# Patient Record
Sex: FEMALE | Race: WHITE | Employment: OTHER | URBAN - METROPOLITAN AREA
[De-identification: names, ages, dates, MRNs, and addresses within clinical notes are randomized per-mention and may not be internally consistent; named-entity substitution may affect disease eponyms.]

---

## 2017-01-05 DIAGNOSIS — R63.4 WEIGHT LOSS: ICD-10-CM

## 2017-01-05 RX ORDER — FENTANYL 25 UG/H
1 PATCH TRANSDERMAL
Qty: 10 PATCH | Refills: 0 | Status: SHIPPED | OUTPATIENT
Start: 2017-01-05 | End: 2017-02-06 | Stop reason: SDUPTHER

## 2017-01-05 RX ORDER — LOSARTAN POTASSIUM AND HYDROCHLOROTHIAZIDE 12.5; 5 MG/1; MG/1
1 TABLET ORAL DAILY
Qty: 90 TABLET | Refills: 3 | Status: SHIPPED | OUTPATIENT
Start: 2017-01-05 | End: 2018-01-15 | Stop reason: SDUPTHER

## 2017-01-09 RX ORDER — DILTIAZEM HYDROCHLORIDE 120 MG/1
CAPSULE, COATED, EXTENDED RELEASE ORAL
Qty: 30 CAPSULE | Refills: 3 | Status: SHIPPED | OUTPATIENT
Start: 2017-01-09 | End: 2017-04-24 | Stop reason: SDUPTHER

## 2017-01-24 RX ORDER — DILTIAZEM HYDROCHLORIDE 120 MG/1
CAPSULE, EXTENDED RELEASE ORAL
Qty: 30 CAPSULE | Refills: 3 | Status: SHIPPED | OUTPATIENT
Start: 2017-01-24 | End: 2018-03-30 | Stop reason: SDUPTHER

## 2017-02-06 DIAGNOSIS — R63.4 WEIGHT LOSS: ICD-10-CM

## 2017-02-06 RX ORDER — FENTANYL 25 UG/H
1 PATCH TRANSDERMAL
Qty: 10 PATCH | Refills: 0 | Status: SHIPPED | OUTPATIENT
Start: 2017-02-06 | End: 2017-03-09 | Stop reason: SDUPTHER

## 2017-03-09 DIAGNOSIS — R63.4 WEIGHT LOSS: ICD-10-CM

## 2017-03-10 RX ORDER — FENTANYL 25 UG/H
1 PATCH TRANSDERMAL
Qty: 10 PATCH | Refills: 0 | Status: SHIPPED | OUTPATIENT
Start: 2017-03-10 | End: 2017-04-07 | Stop reason: SDUPTHER

## 2017-04-07 DIAGNOSIS — R63.4 WEIGHT LOSS: ICD-10-CM

## 2017-04-07 RX ORDER — FENTANYL 25 UG/H
1 PATCH TRANSDERMAL
Qty: 10 PATCH | Refills: 0 | Status: SHIPPED | OUTPATIENT
Start: 2017-04-07 | End: 2017-05-09 | Stop reason: SDUPTHER

## 2017-04-07 RX ORDER — ALENDRONATE SODIUM 70 MG/1
70 TABLET ORAL
Qty: 4 TABLET | Refills: 0 | Status: SHIPPED | OUTPATIENT
Start: 2017-04-07 | End: 2017-05-08 | Stop reason: SDUPTHER

## 2017-04-21 ENCOUNTER — OFFICE VISIT (OUTPATIENT)
Dept: FAMILY MEDICINE CLINIC | Age: 82
End: 2017-04-21
Payer: MEDICARE

## 2017-04-21 VITALS — BODY MASS INDEX: 21.95 KG/M2 | WEIGHT: 120 LBS | DIASTOLIC BLOOD PRESSURE: 62 MMHG | SYSTOLIC BLOOD PRESSURE: 118 MMHG

## 2017-04-21 DIAGNOSIS — Z23 IMMUNIZATION DUE: ICD-10-CM

## 2017-04-21 DIAGNOSIS — E78.00 HIGH CHOLESTEROL: ICD-10-CM

## 2017-04-21 DIAGNOSIS — I10 ESSENTIAL HYPERTENSION: Primary | ICD-10-CM

## 2017-04-21 DIAGNOSIS — Z00.00 ROUTINE HEALTH MAINTENANCE: ICD-10-CM

## 2017-04-21 PROCEDURE — G8510 SCR DEP NEG, NO PLAN REQD: HCPCS | Performed by: FAMILY MEDICINE

## 2017-04-21 PROCEDURE — 99214 OFFICE O/P EST MOD 30 MIN: CPT | Performed by: FAMILY MEDICINE

## 2017-04-21 PROCEDURE — 90732 PPSV23 VACC 2 YRS+ SUBQ/IM: CPT | Performed by: FAMILY MEDICINE

## 2017-04-21 PROCEDURE — 3288F FALL RISK ASSESSMENT DOCD: CPT | Performed by: FAMILY MEDICINE

## 2017-04-21 PROCEDURE — G0009 ADMIN PNEUMOCOCCAL VACCINE: HCPCS | Performed by: FAMILY MEDICINE

## 2017-04-21 ASSESSMENT — PATIENT HEALTH QUESTIONNAIRE - PHQ9
SUM OF ALL RESPONSES TO PHQ9 QUESTIONS 1 & 2: 0
1. LITTLE INTEREST OR PLEASURE IN DOING THINGS: 0
2. FEELING DOWN, DEPRESSED OR HOPELESS: 0
SUM OF ALL RESPONSES TO PHQ QUESTIONS 1-9: 0

## 2017-04-24 RX ORDER — DILTIAZEM HYDROCHLORIDE 120 MG/1
CAPSULE, COATED, EXTENDED RELEASE ORAL
Qty: 90 CAPSULE | Refills: 1 | Status: SHIPPED | OUTPATIENT
Start: 2017-04-24 | End: 2017-10-16 | Stop reason: SDUPTHER

## 2017-04-25 LAB
ALBUMIN SERPL-MCNC: 4.4 G/DL
ALP BLD-CCNC: 85 U/L
ALT SERPL-CCNC: 19 U/L
AST SERPL-CCNC: 31 U/L
BILIRUB SERPL-MCNC: 0.5 MG/DL (ref 0.1–1.4)
BUN BLDV-MCNC: 17 MG/DL
CALCIUM SERPL-MCNC: 10.7 MG/DL
CHLORIDE BLD-SCNC: 101 MMOL/L
CHOLESTEROL, TOTAL: 182 MG/DL
CHOLESTEROL/HDL RATIO: 2.3
CO2: 27 MMOL/L
CREAT SERPL-MCNC: 0.9 MG/DL
GFR CALCULATED: 60
GLUCOSE BLD-MCNC: 95 MG/DL
HDLC SERPL-MCNC: 79 MG/DL (ref 35–70)
LDL CHOLESTEROL CALCULATED: 91 MG/DL (ref 0–160)
POTASSIUM SERPL-SCNC: 3.9 MMOL/L
SODIUM BLD-SCNC: 140 MMOL/L
TOTAL PROTEIN: 7.2
TRIGL SERPL-MCNC: 62 MG/DL
VLDLC SERPL CALC-MCNC: 12 MG/DL

## 2017-04-27 DIAGNOSIS — E78.00 HIGH CHOLESTEROL: ICD-10-CM

## 2017-04-27 DIAGNOSIS — I10 ESSENTIAL HYPERTENSION: ICD-10-CM

## 2017-05-08 RX ORDER — ALENDRONATE SODIUM 70 MG/1
70 TABLET ORAL
Qty: 4 TABLET | Refills: 2 | Status: SHIPPED | OUTPATIENT
Start: 2017-05-08 | End: 2017-07-30 | Stop reason: SDUPTHER

## 2017-05-09 DIAGNOSIS — R63.4 WEIGHT LOSS: ICD-10-CM

## 2017-05-09 RX ORDER — FENTANYL 25 UG/H
1 PATCH TRANSDERMAL
Qty: 10 PATCH | Refills: 0 | Status: SHIPPED | OUTPATIENT
Start: 2017-05-09 | End: 2017-06-05 | Stop reason: SDUPTHER

## 2017-06-05 DIAGNOSIS — R63.4 WEIGHT LOSS: ICD-10-CM

## 2017-06-06 RX ORDER — FENTANYL 25 UG/H
1 PATCH TRANSDERMAL
Qty: 10 PATCH | Refills: 0 | Status: SHIPPED | OUTPATIENT
Start: 2017-06-06 | End: 2017-07-03 | Stop reason: SDUPTHER

## 2017-07-03 DIAGNOSIS — R63.4 WEIGHT LOSS: ICD-10-CM

## 2017-07-03 RX ORDER — FENTANYL 25 UG/H
1 PATCH TRANSDERMAL
Qty: 10 PATCH | Refills: 0 | Status: SHIPPED | OUTPATIENT
Start: 2017-07-03 | End: 2017-08-03 | Stop reason: SDUPTHER

## 2017-07-30 DIAGNOSIS — R63.4 WEIGHT LOSS: ICD-10-CM

## 2017-07-30 RX ORDER — ALENDRONATE SODIUM 70 MG/1
TABLET ORAL
Qty: 4 TABLET | Refills: 0 | Status: SHIPPED | OUTPATIENT
Start: 2017-07-30 | End: 2017-08-28 | Stop reason: SDUPTHER

## 2017-07-30 RX ORDER — ERGOCALCIFEROL 1.25 MG/1
CAPSULE ORAL
Qty: 7 CAPSULE | Refills: 0 | Status: SHIPPED | OUTPATIENT
Start: 2017-07-30 | End: 2017-11-20 | Stop reason: SDUPTHER

## 2017-08-03 DIAGNOSIS — R63.4 WEIGHT LOSS: ICD-10-CM

## 2017-08-03 RX ORDER — FENTANYL 25 UG/H
1 PATCH TRANSDERMAL
Qty: 10 PATCH | Refills: 0 | Status: SHIPPED | OUTPATIENT
Start: 2017-08-03 | End: 2017-09-01 | Stop reason: SDUPTHER

## 2017-08-28 RX ORDER — ALENDRONATE SODIUM 70 MG/1
TABLET ORAL
Qty: 4 TABLET | Refills: 3 | Status: SHIPPED | OUTPATIENT
Start: 2017-08-28 | End: 2017-12-18 | Stop reason: SDUPTHER

## 2017-09-01 DIAGNOSIS — R63.4 WEIGHT LOSS: ICD-10-CM

## 2017-09-01 RX ORDER — FENTANYL 25 UG/H
1 PATCH TRANSDERMAL
Qty: 10 PATCH | Refills: 0 | Status: SHIPPED | OUTPATIENT
Start: 2017-09-01 | End: 2017-10-02 | Stop reason: SDUPTHER

## 2017-09-05 RX ORDER — METOPROLOL SUCCINATE 25 MG/1
12.5 TABLET, EXTENDED RELEASE ORAL DAILY
Qty: 30 TABLET | Refills: 3 | Status: SHIPPED | OUTPATIENT
Start: 2017-09-05 | End: 2018-05-07 | Stop reason: SDUPTHER

## 2017-10-02 DIAGNOSIS — R63.4 WEIGHT LOSS: ICD-10-CM

## 2017-10-02 RX ORDER — FENTANYL 25 UG/H
1 PATCH TRANSDERMAL
Qty: 10 PATCH | Refills: 0 | Status: SHIPPED | OUTPATIENT
Start: 2017-10-02 | End: 2017-10-24 | Stop reason: SDUPTHER

## 2017-10-16 RX ORDER — DILTIAZEM HYDROCHLORIDE 120 MG/1
CAPSULE, COATED, EXTENDED RELEASE ORAL
Qty: 90 CAPSULE | Refills: 1 | Status: SHIPPED | OUTPATIENT
Start: 2017-10-16 | End: 2018-04-23 | Stop reason: SDUPTHER

## 2017-10-24 ENCOUNTER — OFFICE VISIT (OUTPATIENT)
Dept: FAMILY MEDICINE CLINIC | Age: 82
End: 2017-10-24
Payer: MEDICARE

## 2017-10-24 VITALS
OXYGEN SATURATION: 98 % | SYSTOLIC BLOOD PRESSURE: 102 MMHG | WEIGHT: 123 LBS | BODY MASS INDEX: 21.79 KG/M2 | HEIGHT: 63 IN | RESPIRATION RATE: 16 BRPM | DIASTOLIC BLOOD PRESSURE: 68 MMHG | HEART RATE: 79 BPM

## 2017-10-24 DIAGNOSIS — I10 ESSENTIAL HYPERTENSION: Primary | ICD-10-CM

## 2017-10-24 DIAGNOSIS — R63.4 WEIGHT LOSS: ICD-10-CM

## 2017-10-24 DIAGNOSIS — Z23 IMMUNIZATION DUE: ICD-10-CM

## 2017-10-24 DIAGNOSIS — I48.20 CHRONIC ATRIAL FIBRILLATION (HCC): ICD-10-CM

## 2017-10-24 DIAGNOSIS — E78.00 PURE HYPERCHOLESTEROLEMIA: ICD-10-CM

## 2017-10-24 PROCEDURE — 99214 OFFICE O/P EST MOD 30 MIN: CPT | Performed by: FAMILY MEDICINE

## 2017-10-24 PROCEDURE — 90688 IIV4 VACCINE SPLT 0.5 ML IM: CPT | Performed by: FAMILY MEDICINE

## 2017-10-24 PROCEDURE — G0008 ADMIN INFLUENZA VIRUS VAC: HCPCS | Performed by: FAMILY MEDICINE

## 2017-10-24 RX ORDER — FENTANYL 25 UG/H
1 PATCH TRANSDERMAL
Qty: 10 PATCH | Refills: 0 | Status: SHIPPED | OUTPATIENT
Start: 2017-10-24 | End: 2017-12-01 | Stop reason: SDUPTHER

## 2017-10-24 NOTE — PROGRESS NOTES
facility-administered medications for this visit. No Known Allergies    Social History   Substance Use Topics    Smoking status: Never Smoker    Smokeless tobacco: Never Used    Alcohol use Not on file      Comment: social          Objective:      /68   Pulse 79   Resp 16   Ht 5' 2.5\" (1.588 m)   Wt 123 lb (55.8 kg)   SpO2 98%   BMI 22.14 kg/m²   General: Alert and oriented, in no distress   S1 and S2 normal, no murmurs, clicks, gallops or rubs. Regular rate and rhythm. Chest is clear; no wheezes or rales. No edema or JVD. No carotid bruits     Assessment:      Essential hypertension - stable  Hyperlipidemia - stable   dementia       Plan:       1)  Medication: continue current medication regimen unchanged  2)  Recheck in 6 months, sooner should new symptoms or problems arise.   Labs  Ref meds  6 months

## 2017-10-25 LAB
ALBUMIN SERPL-MCNC: 4.3 G/DL
ALP BLD-CCNC: 84 U/L
ALT SERPL-CCNC: 19 U/L
ANION GAP SERPL CALCULATED.3IONS-SCNC: NORMAL MMOL/L
AST SERPL-CCNC: 32 U/L
BILIRUB SERPL-MCNC: 0.8 MG/DL (ref 0.1–1.4)
BUN BLDV-MCNC: 18 MG/DL
CALCIUM SERPL-MCNC: 10.3 MG/DL
CHLORIDE BLD-SCNC: 104 MMOL/L
CHOLESTEROL, TOTAL: 198 MG/DL
CHOLESTEROL/HDL RATIO: 2.1
CO2: 29 MMOL/L
CREAT SERPL-MCNC: 0.86 MG/DL
GFR CALCULATED: 62
GLUCOSE BLD-MCNC: 98 MG/DL
HDLC SERPL-MCNC: 95 MG/DL (ref 35–70)
LDL CHOLESTEROL CALCULATED: 88 MG/DL (ref 0–160)
POTASSIUM SERPL-SCNC: 3.8 MMOL/L
SODIUM BLD-SCNC: 140 MMOL/L
TOTAL PROTEIN: 7.5
TRIGL SERPL-MCNC: 61 MG/DL
VLDLC SERPL CALC-MCNC: ABNORMAL MG/DL

## 2017-10-30 DIAGNOSIS — E78.00 PURE HYPERCHOLESTEROLEMIA: ICD-10-CM

## 2017-10-30 DIAGNOSIS — I10 ESSENTIAL HYPERTENSION: ICD-10-CM

## 2017-11-20 DIAGNOSIS — R63.4 WEIGHT LOSS: ICD-10-CM

## 2017-11-20 RX ORDER — ERGOCALCIFEROL 1.25 MG/1
CAPSULE ORAL
Qty: 7 CAPSULE | Refills: 0 | Status: SHIPPED | OUTPATIENT
Start: 2017-11-20 | End: 2018-02-26 | Stop reason: SDUPTHER

## 2017-11-20 NOTE — TELEPHONE ENCOUNTER
Health Maintenance   Topic Date Due    DTaP/Tdap/Td vaccine (1 - Tdap) 08[birth year]17/1952    Zostavax vaccine  Addressed    DEXA (modify frequency per FRAX score)  Addressed    Flu vaccine  Completed    Pneumococcal low/med risk  Completed       No results found for: LABA1C          ( goal A1C is < 7)   No results found for: LABMICR  LDL Calculated (mg/dL)   Date Value   10/25/2017 88       (goal LDL is <100)   AST (U/L)   Date Value   10/25/2017 32     ALT (U/L)   Date Value   10/25/2017 19     BUN (mg/dL)   Date Value   10/25/2017 18     BP Readings from Last 3 Encounters:   10/24/17 102/68   04/21/17 118/62   12/06/16 100/71          (goal 120/80)    All Future Testing planned in [facility]      Next Visit Date:  No future appointments.          Patient Active Problem List:     Atrial fibrillation ([facility] [address]     Dementia     Hypertension

## 2017-12-01 DIAGNOSIS — R63.4 WEIGHT LOSS: ICD-10-CM

## 2017-12-01 RX ORDER — FENTANYL 25 UG/H
1 PATCH TRANSDERMAL
Qty: 10 PATCH | Refills: 0 | Status: SHIPPED | OUTPATIENT
Start: 2017-12-01 | End: 2017-12-27 | Stop reason: SDUPTHER

## 2017-12-01 NOTE — TELEPHONE ENCOUNTER
Next Visit Date:  No future appointments.     Health Maintenance   Topic Date Due    DTaP/Tdap/Td vaccine (1 - Tdap) 08/17/1952    Zostavax vaccine  Addressed    DEXA (modify frequency per FRAX score)  Addressed    Flu vaccine  Completed    Pneumococcal low/med risk  Completed       No results found for: LABA1C          ( goal A1C is < 7)   No results found for: LABMICR  LDL Calculated (mg/dL)   Date Value   10/25/2017 88       (goal LDL is <100)   AST (U/L)   Date Value   10/25/2017 32     ALT (U/L)   Date Value   10/25/2017 19     BUN (mg/dL)   Date Value   10/25/2017 18     BP Readings from Last 3 Encounters:   10/24/17 102/68   04/21/17 118/62   12/06/16 100/71          (goal 120/80)    All Future Testing planned in [facility]              Patient Active Problem List:     Atrial fibrillation ([facility] [address]     Dementia     Hypertension

## 2017-12-18 RX ORDER — ALENDRONATE SODIUM 70 MG/1
TABLET ORAL
Qty: 4 TABLET | Refills: 3 | Status: SHIPPED | OUTPATIENT
Start: 2017-12-18 | End: 2018-04-09 | Stop reason: SDUPTHER

## 2017-12-18 NOTE — TELEPHONE ENCOUNTER
Next Visit Date:  No future appointments.     Health Maintenance   Topic Date Due    DTaP/Tdap/Td vaccine (1 - Tdap) 08/17/1952    Zostavax vaccine  Addressed    DEXA (modify frequency per FRAX score)  Addressed    Flu vaccine  Completed    Pneumococcal low/med risk  Completed       No results found for: LABA1C          ( goal A1C is < 7)   No results found for: LABMICR  LDL Calculated (mg/dL)   Date Value   10/25/2017 88       (goal LDL is <100)   AST (U/L)   Date Value   10/25/2017 32     ALT (U/L)   Date Value   10/25/2017 19     BUN (mg/dL)   Date Value   10/25/2017 18     BP Readings from Last 3 Encounters:   10/24/17 102/68   04/21/17 118/62   12/06/16 100/71          (goal 120/80)    All Future Testing planned in [facility]              Patient Active Problem List:     Atrial fibrillation ([facility] [address]     Dementia     Hypertension

## 2017-12-27 DIAGNOSIS — R63.4 WEIGHT LOSS: ICD-10-CM

## 2017-12-27 RX ORDER — FENTANYL 25 UG/H
1 PATCH TRANSDERMAL
Qty: 10 PATCH | Refills: 0 | Status: SHIPPED | OUTPATIENT
Start: 2017-12-27 | End: 2018-01-30 | Stop reason: SDUPTHER

## 2017-12-27 NOTE — TELEPHONE ENCOUNTER
Next Visit Date:  No future appointments.     Health Maintenance   Topic Date Due    DTaP/Tdap/Td vaccine (1 - Tdap) 08/17/1952    Zostavax vaccine  Addressed    DEXA (modify frequency per FRAX score)  Addressed    Flu vaccine  Completed    Pneumococcal low/med risk  Completed       No results found for: LABA1C          ( goal A1C is < 7)   No results found for: LABMICR  LDL Calculated (mg/dL)   Date Value   10/25/2017 88       (goal LDL is <100)   AST (U/L)   Date Value   10/25/2017 32     ALT (U/L)   Date Value   10/25/2017 19     BUN (mg/dL)   Date Value   10/25/2017 18     BP Readings from Last 3 Encounters:   10/24/17 102/68   04/21/17 118/62   12/06/16 100/71          (goal 120/80)    All Future Testing planned in [facility]              Patient Active Problem List:     Atrial fibrillation ([facility] [address]     Dementia     Hypertension

## 2018-01-16 RX ORDER — LOSARTAN POTASSIUM AND HYDROCHLOROTHIAZIDE 12.5; 5 MG/1; MG/1
1 TABLET ORAL DAILY
Qty: 90 TABLET | Refills: 3 | Status: SHIPPED | OUTPATIENT
Start: 2018-01-16 | End: 2018-06-28 | Stop reason: SDUPTHER

## 2018-01-30 DIAGNOSIS — G89.4 CHRONIC PAIN SYNDROME: Primary | ICD-10-CM

## 2018-01-30 DIAGNOSIS — R63.4 WEIGHT LOSS: ICD-10-CM

## 2018-01-30 RX ORDER — FENTANYL 25 UG/H
1 PATCH TRANSDERMAL
Qty: 10 PATCH | Refills: 0 | Status: SHIPPED | OUTPATIENT
Start: 2018-01-30 | End: 2018-03-02 | Stop reason: SDUPTHER

## 2018-02-05 ENCOUNTER — TELEPHONE (OUTPATIENT)
Dept: FAMILY MEDICINE CLINIC | Age: 83
End: 2018-02-05

## 2018-02-05 NOTE — TELEPHONE ENCOUNTER
Patient needs a letter to renew her handicap placard. She moved to [place] and they say her [place] one is no longer valid. .please mail to patient.

## 2018-02-12 ENCOUNTER — TELEPHONE (OUTPATIENT)
Dept: FAMILY MEDICINE CLINIC | Age: 83
End: 2018-02-12

## 2018-02-26 RX ORDER — ERGOCALCIFEROL 1.25 MG/1
50000 CAPSULE ORAL
Qty: 30 CAPSULE | Refills: 3 | Status: SHIPPED | OUTPATIENT
Start: 2018-02-26

## 2018-02-27 ENCOUNTER — TELEPHONE (OUTPATIENT)
Dept: FAMILY MEDICINE CLINIC | Age: 83
End: 2018-02-27

## 2018-03-01 ENCOUNTER — TELEPHONE (OUTPATIENT)
Dept: FAMILY MEDICINE CLINIC | Age: 83
End: 2018-03-01

## 2018-03-02 ENCOUNTER — TELEPHONE (OUTPATIENT)
Dept: FAMILY MEDICINE CLINIC | Age: 83
End: 2018-03-02

## 2018-03-02 DIAGNOSIS — G89.4 CHRONIC PAIN SYNDROME: ICD-10-CM

## 2018-03-02 RX ORDER — FENTANYL 25 UG/H
1 PATCH TRANSDERMAL
Qty: 10 PATCH | Refills: 0 | Status: SHIPPED | OUTPATIENT
Start: 2018-03-02 | End: 2018-03-30 | Stop reason: SDUPTHER

## 2018-03-30 ENCOUNTER — OFFICE VISIT (OUTPATIENT)
Dept: FAMILY MEDICINE CLINIC | Age: 83
End: 2018-03-30
Payer: MEDICARE

## 2018-03-30 VITALS
WEIGHT: 124.8 LBS | BODY MASS INDEX: 22.46 KG/M2 | DIASTOLIC BLOOD PRESSURE: 72 MMHG | SYSTOLIC BLOOD PRESSURE: 112 MMHG | HEART RATE: 68 BPM

## 2018-03-30 DIAGNOSIS — M15.9 PRIMARY OSTEOARTHRITIS INVOLVING MULTIPLE JOINTS: ICD-10-CM

## 2018-03-30 DIAGNOSIS — G89.4 CHRONIC PAIN SYNDROME: Primary | ICD-10-CM

## 2018-03-30 PROCEDURE — 99212 OFFICE O/P EST SF 10 MIN: CPT | Performed by: FAMILY MEDICINE

## 2018-03-30 RX ORDER — FENTANYL 25 UG/H
1 PATCH TRANSDERMAL
Qty: 10 PATCH | Refills: 0 | Status: SHIPPED | OUTPATIENT
Start: 2018-03-30 | End: 2018-05-01 | Stop reason: SDUPTHER

## 2018-04-09 RX ORDER — ALENDRONATE SODIUM 70 MG/1
TABLET ORAL
Qty: 4 TABLET | Refills: 11 | Status: SHIPPED | OUTPATIENT
Start: 2018-04-09 | End: 2018-06-28 | Stop reason: SDUPTHER

## 2018-04-20 ENCOUNTER — TELEPHONE (OUTPATIENT)
Dept: FAMILY MEDICINE CLINIC | Age: 83
End: 2018-04-20

## 2018-04-20 DIAGNOSIS — I10 ESSENTIAL HYPERTENSION: ICD-10-CM

## 2018-04-20 DIAGNOSIS — E55.9 VITAMIN D DEFICIENCY: ICD-10-CM

## 2018-04-20 DIAGNOSIS — I48.91 ATRIAL FIBRILLATION, UNSPECIFIED TYPE (HCC): Primary | ICD-10-CM

## 2018-04-20 DIAGNOSIS — E78.00 HYPERCHOLESTEREMIA: ICD-10-CM

## 2018-04-24 RX ORDER — DILTIAZEM HYDROCHLORIDE 120 MG/1
CAPSULE, COATED, EXTENDED RELEASE ORAL
Qty: 90 CAPSULE | Refills: 3 | Status: SHIPPED | OUTPATIENT
Start: 2018-04-24 | End: 2018-07-06 | Stop reason: SDUPTHER

## 2018-04-25 LAB
ALBUMIN SERPL-MCNC: 4.1 G/DL
ALP BLD-CCNC: 71 U/L
ALT SERPL-CCNC: 16 U/L
ANION GAP SERPL CALCULATED.3IONS-SCNC: NORMAL MMOL/L
AST SERPL-CCNC: 25 U/L
BILIRUB SERPL-MCNC: 0.7 MG/DL (ref 0.1–1.4)
BUN BLDV-MCNC: 18 MG/DL
CALCIUM SERPL-MCNC: 10.1 MG/DL
CHLORIDE BLD-SCNC: 104 MMOL/L
CHOLESTEROL, TOTAL: 193 MG/DL
CHOLESTEROL/HDL RATIO: 2.1
CO2: 29 MMOL/L
CREAT SERPL-MCNC: 0.83 MG/DL
GFR CALCULATED: 65
GLUCOSE BLD-MCNC: 87 MG/DL
HDLC SERPL-MCNC: 91 MG/DL (ref 35–70)
LDL CHOLESTEROL CALCULATED: 86 MG/DL (ref 0–160)
POTASSIUM SERPL-SCNC: 3.8 MMOL/L
SODIUM BLD-SCNC: 139 MMOL/L
TOTAL PROTEIN: 6.8
TRIGL SERPL-MCNC: 71 MG/DL
VITAMIN D 25-HYDROXY: 25
VITAMIN D2, 25 HYDROXY: NORMAL
VITAMIN D3,25 HYDROXY: NORMAL
VLDLC SERPL CALC-MCNC: ABNORMAL MG/DL

## 2018-04-27 ENCOUNTER — OFFICE VISIT (OUTPATIENT)
Dept: FAMILY MEDICINE CLINIC | Age: 83
End: 2018-04-27
Payer: MEDICARE

## 2018-04-27 VITALS
DIASTOLIC BLOOD PRESSURE: 80 MMHG | WEIGHT: 128 LBS | HEART RATE: 72 BPM | BODY MASS INDEX: 23.04 KG/M2 | SYSTOLIC BLOOD PRESSURE: 122 MMHG

## 2018-04-27 DIAGNOSIS — Z13.220 SCREENING, LIPID: ICD-10-CM

## 2018-04-27 DIAGNOSIS — E55.9 VITAMIN D DEFICIENCY: ICD-10-CM

## 2018-04-27 DIAGNOSIS — I48.91 ATRIAL FIBRILLATION, UNSPECIFIED TYPE (HCC): ICD-10-CM

## 2018-04-27 DIAGNOSIS — I10 ESSENTIAL HYPERTENSION: ICD-10-CM

## 2018-04-27 DIAGNOSIS — E78.00 HYPERCHOLESTEREMIA: ICD-10-CM

## 2018-04-27 DIAGNOSIS — I10 ESSENTIAL HYPERTENSION: Primary | ICD-10-CM

## 2018-04-27 PROCEDURE — 99213 OFFICE O/P EST LOW 20 MIN: CPT | Performed by: FAMILY MEDICINE

## 2018-04-27 ASSESSMENT — PATIENT HEALTH QUESTIONNAIRE - PHQ9
SUM OF ALL RESPONSES TO PHQ9 QUESTIONS 1 & 2: 0
1. LITTLE INTEREST OR PLEASURE IN DOING THINGS: 0
SUM OF ALL RESPONSES TO PHQ QUESTIONS 1-9: 0
2. FEELING DOWN, DEPRESSED OR HOPELESS: 0

## 2018-05-01 DIAGNOSIS — G89.4 CHRONIC PAIN SYNDROME: ICD-10-CM

## 2018-05-01 RX ORDER — FENTANYL 25 UG/H
1 PATCH TRANSDERMAL
Qty: 10 PATCH | Refills: 0 | Status: SHIPPED | OUTPATIENT
Start: 2018-05-01 | End: 2018-05-31 | Stop reason: SDUPTHER

## 2018-05-08 RX ORDER — METOPROLOL SUCCINATE 25 MG/1
12.5 TABLET, EXTENDED RELEASE ORAL DAILY
Qty: 30 TABLET | Refills: 3 | Status: SHIPPED | OUTPATIENT
Start: 2018-05-08 | End: 2018-06-28 | Stop reason: SDUPTHER

## 2018-05-23 ENCOUNTER — TELEPHONE (OUTPATIENT)
Dept: FAMILY MEDICINE CLINIC | Age: 83
End: 2018-05-23

## 2018-05-25 ENCOUNTER — OFFICE VISIT (OUTPATIENT)
Dept: FAMILY MEDICINE CLINIC | Age: 83
End: 2018-05-25
Payer: MEDICARE

## 2018-05-25 VITALS
SYSTOLIC BLOOD PRESSURE: 122 MMHG | DIASTOLIC BLOOD PRESSURE: 68 MMHG | BODY MASS INDEX: 23.25 KG/M2 | WEIGHT: 129.2 LBS | HEART RATE: 72 BPM

## 2018-05-25 DIAGNOSIS — M46.1 SACROILIITIS (HCC): Primary | ICD-10-CM

## 2018-05-25 PROCEDURE — 99213 OFFICE O/P EST LOW 20 MIN: CPT | Performed by: FAMILY MEDICINE

## 2018-05-25 PROCEDURE — 96372 THER/PROPH/DIAG INJ SC/IM: CPT | Performed by: FAMILY MEDICINE

## 2018-05-25 RX ORDER — TRIAMCINOLONE ACETONIDE 40 MG/ML
40 INJECTION, SUSPENSION INTRA-ARTICULAR; INTRAMUSCULAR ONCE
Status: COMPLETED | OUTPATIENT
Start: 2018-05-25 | End: 2018-05-25

## 2018-05-25 RX ADMIN — TRIAMCINOLONE ACETONIDE 40 MG: 40 INJECTION, SUSPENSION INTRA-ARTICULAR; INTRAMUSCULAR at 11:00

## 2018-05-31 DIAGNOSIS — G89.4 CHRONIC PAIN SYNDROME: ICD-10-CM

## 2018-05-31 RX ORDER — FENTANYL 25 UG/H
1 PATCH TRANSDERMAL
Qty: 10 PATCH | Refills: 0 | Status: SHIPPED | OUTPATIENT
Start: 2018-05-31 | End: 2018-07-05 | Stop reason: SDUPTHER

## 2018-06-29 RX ORDER — APIXABAN 2.5 MG/1
TABLET, FILM COATED ORAL
Qty: 60 TABLET | Refills: 11 | Status: SHIPPED | OUTPATIENT
Start: 2018-06-29

## 2018-06-29 RX ORDER — LOSARTAN POTASSIUM AND HYDROCHLOROTHIAZIDE 12.5; 5 MG/1; MG/1
TABLET ORAL
Qty: 30 TABLET | Refills: 11 | Status: SHIPPED | OUTPATIENT
Start: 2018-06-29

## 2018-06-29 RX ORDER — ALENDRONATE SODIUM 70 MG/1
TABLET ORAL
Qty: 4 TABLET | Refills: 11 | Status: SHIPPED | OUTPATIENT
Start: 2018-06-29 | End: 2019-05-06 | Stop reason: ALTCHOICE

## 2018-06-29 RX ORDER — ASPIRIN 81 MG/1
TABLET ORAL
Qty: 30 TABLET | Refills: 11 | Status: SHIPPED | OUTPATIENT
Start: 2018-06-29

## 2018-06-29 RX ORDER — FOLIC ACID 1 MG/1
TABLET ORAL
Qty: 30 TABLET | Refills: 11 | Status: SHIPPED | OUTPATIENT
Start: 2018-06-29

## 2018-06-29 RX ORDER — FERROUS SULFATE 325(65) MG
TABLET ORAL
Qty: 60 TABLET | Refills: 11 | Status: SHIPPED | OUTPATIENT
Start: 2018-06-29

## 2018-06-29 RX ORDER — METOPROLOL SUCCINATE 25 MG/1
TABLET, EXTENDED RELEASE ORAL
Qty: 15 TABLET | Refills: 11 | Status: SHIPPED | OUTPATIENT
Start: 2018-06-29

## 2018-07-05 DIAGNOSIS — G89.4 CHRONIC PAIN SYNDROME: ICD-10-CM

## 2018-07-05 RX ORDER — FENTANYL 25 UG/H
1 PATCH TRANSDERMAL
Qty: 10 PATCH | Refills: 0 | Status: SHIPPED | OUTPATIENT
Start: 2018-07-05 | End: 2018-07-27 | Stop reason: SDUPTHER

## 2018-07-08 RX ORDER — DILTIAZEM HYDROCHLORIDE 120 MG/1
CAPSULE, COATED, EXTENDED RELEASE ORAL
Qty: 30 CAPSULE | Refills: 0 | Status: SHIPPED | OUTPATIENT
Start: 2018-07-08 | End: 2018-07-31 | Stop reason: SDUPTHER

## 2018-07-16 ENCOUNTER — TELEPHONE (OUTPATIENT)
Dept: FAMILY MEDICINE CLINIC | Age: 83
End: 2018-07-16

## 2018-07-16 ENCOUNTER — OFFICE VISIT (OUTPATIENT)
Dept: FAMILY MEDICINE CLINIC | Age: 83
End: 2018-07-16
Payer: MEDICARE

## 2018-07-16 VITALS
WEIGHT: 131.4 LBS | DIASTOLIC BLOOD PRESSURE: 80 MMHG | SYSTOLIC BLOOD PRESSURE: 118 MMHG | BODY MASS INDEX: 23.65 KG/M2 | HEART RATE: 72 BPM | OXYGEN SATURATION: 97 %

## 2018-07-16 DIAGNOSIS — F03.90 CHRONIC DEMENTIA WITHOUT BEHAVIORAL DISTURBANCE (HCC): ICD-10-CM

## 2018-07-16 DIAGNOSIS — I48.91 ATRIAL FIBRILLATION, UNSPECIFIED TYPE (HCC): ICD-10-CM

## 2018-07-16 DIAGNOSIS — R60.0 LOWER EXTREMITY EDEMA: Primary | ICD-10-CM

## 2018-07-16 LAB
ALBUMIN SERPL-MCNC: 4.1 G/DL
ALP BLD-CCNC: 83 U/L
ALT SERPL-CCNC: 25 U/L
ANION GAP SERPL CALCULATED.3IONS-SCNC: NORMAL MMOL/L
AST SERPL-CCNC: 33 U/L
BILIRUB SERPL-MCNC: 0.6 MG/DL (ref 0.1–1.4)
BUN BLDV-MCNC: 23 MG/DL
CALCIUM SERPL-MCNC: 10.4 MG/DL
CHLORIDE BLD-SCNC: 104 MMOL/L
CO2: 28 MMOL/L
CREAT SERPL-MCNC: 0.86 MG/DL
GFR CALCULATED: NORMAL
GLUCOSE BLD-MCNC: 92 MG/DL
POTASSIUM SERPL-SCNC: 3.9 MMOL/L
SODIUM BLD-SCNC: 141 MMOL/L
TOTAL PROTEIN: 6.9
TSH SERPL DL<=0.05 MIU/L-ACNC: 1.16 UIU/ML

## 2018-07-16 PROCEDURE — 99213 OFFICE O/P EST LOW 20 MIN: CPT | Performed by: FAMILY MEDICINE

## 2018-07-16 NOTE — TELEPHONE ENCOUNTER
Patient  called and his wife feet and legs are swollen.  They have been swollen for a week now and want to know if you want to see her or call something in

## 2018-07-16 NOTE — PROGRESS NOTES
Subjective:      Patient ID: Demetrius Guidry is a 80 y.o. female. HPI     Here for evaluation of increased swelling to the legs    Swelling is bilateral. There is no erythema or pain. She has recently moved in to [facility]. There have been no changes in her medications. She denies any shortness of breath or chest discomfort. She has been less mobile since moving to the facility. No increased salt in diet that they are aware of. Review of Systems   Except as noted in HPI, ROS negative    Objective:   Physical Exam   Constitutional: She appears well-developed and well-nourished. No distress. HENT:   Head: Normocephalic and atraumatic. Cardiovascular: Normal rate. Irregular rhythm   Pulmonary/Chest: She has no wheezes. She has no rales. Musculoskeletal: She exhibits edema (2-3+). No calf tenderness bilaterally, андрей's negative bilaterally   Neurological: She is alert. Skin: Skin is warm and dry. No erythema. Vitals reviewed. Assessment:      1.  Lower extremity edema          Plan:      Compression stockings, elevation  Check labs  If no improvement will check echo

## 2018-07-27 DIAGNOSIS — G89.4 CHRONIC PAIN SYNDROME: ICD-10-CM

## 2018-07-27 RX ORDER — FENTANYL 25 UG/H
1 PATCH TRANSDERMAL
Qty: 10 PATCH | Refills: 0 | Status: SHIPPED | OUTPATIENT
Start: 2018-07-27 | End: 2018-08-27 | Stop reason: SDUPTHER

## 2018-07-31 DIAGNOSIS — R60.0 LOWER EXTREMITY EDEMA: ICD-10-CM

## 2018-08-01 RX ORDER — DILTIAZEM HYDROCHLORIDE 120 MG/1
CAPSULE, COATED, EXTENDED RELEASE ORAL
Qty: 30 CAPSULE | Refills: 11 | Status: SHIPPED | OUTPATIENT
Start: 2018-08-01

## 2018-08-03 DIAGNOSIS — R06.02 SOB (SHORTNESS OF BREATH): ICD-10-CM

## 2018-08-03 DIAGNOSIS — I87.309 STASIS EDEMA, UNSPECIFIED LATERALITY: Primary | ICD-10-CM

## 2018-08-06 ENCOUNTER — TELEPHONE (OUTPATIENT)
Dept: FAMILY MEDICINE CLINIC | Age: 83
End: 2018-08-06

## 2018-08-06 NOTE — TELEPHONE ENCOUNTER
Pts  called because they have not heard from [facility] to get the echo scheduled. They are very concerned because they swelling is not going down. Please to office pool.

## 2018-08-17 LAB
LEFT VENTRICULAR EJECTION FRACTION HIGH VALUE: NORMAL %
LEFT VENTRICULAR EJECTION FRACTION MODE: NORMAL
LV EF: NORMAL %

## 2018-08-21 DIAGNOSIS — R06.02 SOB (SHORTNESS OF BREATH): ICD-10-CM

## 2018-08-21 DIAGNOSIS — I87.309 STASIS EDEMA, UNSPECIFIED LATERALITY: ICD-10-CM

## 2018-08-23 DIAGNOSIS — I07.1 SEVERE TRICUSPID REGURGITATION: Primary | ICD-10-CM

## 2018-08-24 DIAGNOSIS — I07.1 SEVERE TRICUSPID REGURGITATION: Primary | ICD-10-CM

## 2018-08-27 DIAGNOSIS — G89.4 CHRONIC PAIN SYNDROME: ICD-10-CM

## 2018-08-27 RX ORDER — FENTANYL 25 UG/H
1 PATCH TRANSDERMAL
Qty: 10 PATCH | Refills: 0 | Status: SHIPPED | OUTPATIENT
Start: 2018-08-27 | End: 2018-10-10 | Stop reason: SDUPTHER

## 2018-08-27 NOTE — TELEPHONE ENCOUNTER
Last visit:7/16/18  Last Med refill:7/27/18    Next Visit Date:  Future Appointments  Date Time Provider Luis East   10/16/2018 10:40 AM Ana Maria Johnson  [address] [facility]   Topic Date Due    DTaP/Tdap/Td vaccine (1 - Tdap) 08/17/1952    Shingles Vaccine (1 of 2 - 2 Dose Series) 08/17/1983    Flu vaccine (1) 09/01/2018    Potassium monitoring  07/16/2019    Creatinine monitoring  07/16/2019    DEXA (modify frequency per FRAX score)  Addressed    Pneumococcal low/med risk  Completed       No results found for: LABA1C          ( goal A1C is < 7)   No results found for: LABMICR  LDL Calculated (mg/dL)   Date Value   04/24/2018 86   10/25/2017 88       (goal LDL is <100)   AST (U/L)   Date Value   07/16/2018 33     ALT (U/L)   Date Value   07/16/2018 25     BUN (mg/dL)   Date Value   07/16/2018 23     BP Readings from Last 3 Encounters:   07/16/18 118/80   05/25/18 122/68   04/27/18 122/80          (goal 120/80)    All Future Testing planned in [facility]  Lab Frequency Next Occurrence   Lipid Panel Once 10/27/2018   Vitamin D 25 Hydroxy Once 10/27/2018   XR SACROILIAC JOINTS (MIN 3 VIEWS) Once 05/25/2018               Patient Active Problem List:     Atrial fibrillation ([facility] [address]     Dementia     Hypertension

## 2018-09-06 ENCOUNTER — TELEPHONE (OUTPATIENT)
Dept: FAMILY MEDICINE CLINIC | Age: 83
End: 2018-09-06

## 2018-09-06 RX ORDER — FUROSEMIDE 20 MG/1
20 TABLET ORAL DAILY
Qty: 60 TABLET | Refills: 3 | Status: SHIPPED | OUTPATIENT
Start: 2018-09-06

## 2018-09-06 NOTE — TELEPHONE ENCOUNTER
Her podiatrist and caregiver said to call her PCP for a diuretic and her legs have a lot of swelling and doesn't see the cardiologist for another 2 weeks. Please advise.

## 2018-10-02 LAB
BUN BLDV-MCNC: 34 MG/DL
CALCIUM SERPL-MCNC: 9.9 MG/DL
CHLORIDE BLD-SCNC: 101 MMOL/L
CO2: 28 MMOL/L
CREAT SERPL-MCNC: 1.17 MG/DL
GFR CALCULATED: 44
GLUCOSE BLD-MCNC: 75 MG/DL
POTASSIUM SERPL-SCNC: 4.4 MMOL/L
SODIUM BLD-SCNC: 137 MMOL/L

## 2018-10-10 DIAGNOSIS — G89.4 CHRONIC PAIN SYNDROME: ICD-10-CM

## 2018-10-10 RX ORDER — FENTANYL 25 UG/H
1 PATCH TRANSDERMAL
Qty: 10 PATCH | Refills: 0 | Status: SHIPPED | OUTPATIENT
Start: 2018-10-10 | End: 2018-11-06 | Stop reason: SDUPTHER

## 2018-10-16 ENCOUNTER — OFFICE VISIT (OUTPATIENT)
Dept: FAMILY MEDICINE CLINIC | Age: 83
End: 2018-10-16
Payer: MEDICARE

## 2018-10-16 VITALS — BODY MASS INDEX: 23.22 KG/M2 | DIASTOLIC BLOOD PRESSURE: 80 MMHG | WEIGHT: 129 LBS | SYSTOLIC BLOOD PRESSURE: 122 MMHG

## 2018-10-16 DIAGNOSIS — R60.0 LOWER EXTREMITY EDEMA: ICD-10-CM

## 2018-10-16 DIAGNOSIS — I48.91 ATRIAL FIBRILLATION, UNSPECIFIED TYPE (HCC): ICD-10-CM

## 2018-10-16 DIAGNOSIS — F03.90 CHRONIC DEMENTIA WITHOUT BEHAVIORAL DISTURBANCE (HCC): ICD-10-CM

## 2018-10-16 DIAGNOSIS — G89.4 CHRONIC PAIN SYNDROME: Primary | ICD-10-CM

## 2018-10-16 DIAGNOSIS — I07.1 SEVERE TRICUSPID REGURGITATION: ICD-10-CM

## 2018-10-16 DIAGNOSIS — I10 ESSENTIAL HYPERTENSION: ICD-10-CM

## 2018-10-16 DIAGNOSIS — R06.02 SOB (SHORTNESS OF BREATH): ICD-10-CM

## 2018-10-16 PROCEDURE — 99214 OFFICE O/P EST MOD 30 MIN: CPT | Performed by: FAMILY MEDICINE

## 2018-10-16 RX ORDER — SPIRONOLACTONE 25 MG/1
TABLET ORAL DAILY
COMMUNITY
Start: 2018-10-08

## 2018-11-06 DIAGNOSIS — G89.4 CHRONIC PAIN SYNDROME: ICD-10-CM

## 2018-11-06 RX ORDER — FENTANYL 25 UG/H
1 PATCH TRANSDERMAL
Qty: 10 PATCH | Refills: 0 | Status: SHIPPED | OUTPATIENT
Start: 2018-11-06 | End: 2018-12-11 | Stop reason: SDUPTHER

## 2018-11-06 NOTE — TELEPHONE ENCOUNTER
oarrs 10/16/18        Last visit: 10/16/18  Last Med refill: 10/10/18    Next Visit Date:  Future Appointments  Date Time Provider Luis East   1/16/2019 11:00 AM Junior Moran  [facility] Maintenance   Topic Date Due    DTaP/Tdap/Td vaccine (1 - Tdap) 08/17/1952    Shingles Vaccine (1 of 2 - 2 Dose Series) 08/17/1983    Potassium monitoring  10/02/2019    Creatinine monitoring  10/02/2019    DEXA (modify frequency per FRAX score)  Addressed    Flu vaccine  Completed    Pneumococcal low/med risk  Completed       No results found for: LABA1C          ( goal A1C is < 7)   No results found for: LABMICR  LDL Calculated (mg/dL)   Date Value   04/24/2018 86   10/25/2017 88       (goal LDL is <100)   AST (U/L)   Date Value   07/16/2018 33     ALT (U/L)   Date Value   07/16/2018 25     BUN (mg/dL)   Date Value   10/02/2018 34     BP Readings from Last 3 Encounters:   10/16/18 122/80   07/16/18 118/80   05/25/18 122/68          (goal 120/80)    All Future Testing planned in [facility]  Lab Frequency Next Occurrence   Lipid Panel Once 10/27/2018   Vitamin D 25 Hydroxy Once 10/27/2018   XR SACROILIAC JOINTS (MIN 3 VIEWS) Once 05/25/2018               Patient Active Problem List:     Atrial fibrillation ([facility] [address]     Dementia     Hypertension

## 2018-12-11 DIAGNOSIS — G89.4 CHRONIC PAIN SYNDROME: ICD-10-CM

## 2018-12-11 RX ORDER — FENTANYL 25 UG/H
1 PATCH TRANSDERMAL
Qty: 10 PATCH | Refills: 0 | Status: SHIPPED | OUTPATIENT
Start: 2018-12-11 | End: 2019-01-07 | Stop reason: SDUPTHER

## 2018-12-11 NOTE — TELEPHONE ENCOUNTER
Last visit: 10-16-18  Last Med refill: 11-6-18 OARRS 10-16-18  Does patient have enough medication for 72 hours: Yes    Next Visit Date:  Future Appointments  Date Time Provider Luis East   1/16/2019 11:00 AM [address] Maintenance   Topic Date Due    DTaP/Tdap/Td vaccine (1 - Tdap) 08/17/1952    Shingles Vaccine (1 of 2 - 2 Dose Series) 08/17/1983    Potassium monitoring  10/02/2019    Creatinine monitoring  10/02/2019    DEXA (modify frequency per FRAX score)  Addressed    Flu vaccine  Completed    Pneumococcal low/med risk  Completed       No results found for: LABA1C          ( goal A1C is < 7)   No results found for: LABMICR  LDL Calculated (mg/dL)   Date Value   04/24/2018 86   10/25/2017 88       (goal LDL is <100)   AST (U/L)   Date Value   07/16/2018 33     ALT (U/L)   Date Value   07/16/2018 25     BUN (mg/dL)   Date Value   10/02/2018 34     BP Readings from Last 3 Encounters:   10/16/18 122/80   07/16/18 118/80   05/25/18 122/68          (goal 120/80)    All Future Testing planned in [facility]  Lab Frequency Next Occurrence   Lipid Panel Once 10/27/2018   Vitamin D 25 Hydroxy Once 10/27/2018   XR SACROILIAC JOINTS (MIN 3 VIEWS) Once 05/25/2018               Patient Active Problem List:     Atrial fibrillation ([facility] [address]     Dementia     Hypertension

## 2019-01-07 DIAGNOSIS — G89.4 CHRONIC PAIN SYNDROME: ICD-10-CM

## 2019-01-07 RX ORDER — FENTANYL 25 UG/H
1 PATCH TRANSDERMAL
Qty: 10 PATCH | Refills: 0 | Status: SHIPPED | OUTPATIENT
Start: 2019-01-07 | End: 2019-03-14 | Stop reason: SDUPTHER

## 2019-02-05 ENCOUNTER — TELEPHONE (OUTPATIENT)
Dept: FAMILY MEDICINE CLINIC | Age: 84
End: 2019-02-05

## 2019-02-07 ENCOUNTER — OFFICE VISIT (OUTPATIENT)
Dept: FAMILY MEDICINE CLINIC | Age: 84
End: 2019-02-07
Payer: MEDICARE

## 2019-02-07 VITALS
BODY MASS INDEX: 24.26 KG/M2 | SYSTOLIC BLOOD PRESSURE: 112 MMHG | OXYGEN SATURATION: 93 % | DIASTOLIC BLOOD PRESSURE: 72 MMHG | WEIGHT: 134.8 LBS | HEART RATE: 83 BPM

## 2019-02-07 DIAGNOSIS — S22.000A COMPRESSION FRACTURE OF BODY OF THORACIC VERTEBRA (HCC): ICD-10-CM

## 2019-02-07 DIAGNOSIS — K44.9 HIATAL HERNIA: ICD-10-CM

## 2019-02-07 DIAGNOSIS — I71.9 AORTIC ANEURYSM WITHOUT RUPTURE, UNSPECIFIED PORTION OF AORTA (HCC): Primary | ICD-10-CM

## 2019-02-07 DIAGNOSIS — K86.2 CYSTIC MASS OF PANCREAS: ICD-10-CM

## 2019-02-07 DIAGNOSIS — R91.8 LUNG NODULES: ICD-10-CM

## 2019-02-07 DIAGNOSIS — S22.20XD CLOSED FRACTURE OF STERNUM WITH ROUTINE HEALING, UNSPECIFIED PORTION OF STERNUM, SUBSEQUENT ENCOUNTER: ICD-10-CM

## 2019-02-07 PROCEDURE — 99213 OFFICE O/P EST LOW 20 MIN: CPT | Performed by: FAMILY MEDICINE

## 2019-02-07 ASSESSMENT — PATIENT HEALTH QUESTIONNAIRE - PHQ9
SUM OF ALL RESPONSES TO PHQ QUESTIONS 1-9: 0
SUM OF ALL RESPONSES TO PHQ QUESTIONS 1-9: 0
2. FEELING DOWN, DEPRESSED OR HOPELESS: 0
SUM OF ALL RESPONSES TO PHQ9 QUESTIONS 1 & 2: 0
1. LITTLE INTEREST OR PLEASURE IN DOING THINGS: 0

## 2019-03-14 DIAGNOSIS — G89.4 CHRONIC PAIN SYNDROME: ICD-10-CM

## 2019-03-14 RX ORDER — FENTANYL 25 UG/H
1 PATCH TRANSDERMAL
Qty: 10 PATCH | Refills: 0 | Status: SHIPPED | OUTPATIENT
Start: 2019-03-14 | End: 2019-04-11 | Stop reason: SDUPTHER

## 2019-03-18 ENCOUNTER — TELEPHONE (OUTPATIENT)
Dept: FAMILY MEDICINE CLINIC | Age: 84
End: 2019-03-18

## 2019-04-11 DIAGNOSIS — G89.4 CHRONIC PAIN SYNDROME: ICD-10-CM

## 2019-04-11 RX ORDER — FENTANYL 25 UG/H
1 PATCH TRANSDERMAL
Qty: 10 PATCH | Refills: 0 | Status: SHIPPED | OUTPATIENT
Start: 2019-04-11 | End: 2019-05-13 | Stop reason: SDUPTHER

## 2019-04-11 NOTE — TELEPHONE ENCOUNTER
Last visit: 2/7/19  Last Med refill: 3-14-19  Does patient have enough medication for 72 hours: Yes    Next Visit Date:  Future Appointments   Date Time Provider Luis East   5/6/2019 10:30 AM Shantanu Pisano, [address] [facility] Maintenance   Topic Date Due    DTaP/Tdap/Td vaccine (1 - Tdap) 08/17/1952    Shingles Vaccine (1 of 2) 08/17/1983    Potassium monitoring  10/02/2019    Creatinine monitoring  10/02/2019    Flu vaccine  Completed    Pneumococcal 65+ years Vaccine  Completed    DEXA (modify frequency per FRAX score)  Addressed       No results found for: LABA1C          ( goal A1C is < 7)   No results found for: LABMICR  LDL Calculated (mg/dL)   Date Value   04/24/2018 86   10/25/2017 88       (goal LDL is <100)   AST (U/L)   Date Value   07/16/2018 33     ALT (U/L)   Date Value   07/16/2018 25     BUN (mg/dL)   Date Value   10/02/2018 34     BP Readings from Last 3 Encounters:   02/07/19 112/72   10/16/18 122/80   07/16/18 118/80          (goal 120/80)    All Future Testing planned in [facility]  Lab Frequency Next Occurrence   Lipid Panel Once 10/27/2018   Vitamin D 25 Hydroxy Once 10/27/2018   XR SACROILIAC JOINTS (MIN 3 VIEWS) Once 05/25/2018               Patient Active Problem List:     Atrial fibrillation ([facility] [address]     Dementia     Hypertension

## 2019-05-06 ENCOUNTER — OFFICE VISIT (OUTPATIENT)
Dept: FAMILY MEDICINE CLINIC | Age: 84
End: 2019-05-06
Payer: MEDICARE

## 2019-05-06 VITALS — BODY MASS INDEX: 23.94 KG/M2 | WEIGHT: 133 LBS | SYSTOLIC BLOOD PRESSURE: 102 MMHG | DIASTOLIC BLOOD PRESSURE: 72 MMHG

## 2019-05-06 DIAGNOSIS — I48.91 ATRIAL FIBRILLATION, UNSPECIFIED TYPE (HCC): ICD-10-CM

## 2019-05-06 DIAGNOSIS — G89.4 CHRONIC PAIN SYNDROME: Primary | ICD-10-CM

## 2019-05-06 DIAGNOSIS — G30.9 ALZHEIMER'S DEMENTIA WITHOUT BEHAVIORAL DISTURBANCE, UNSPECIFIED TIMING OF DEMENTIA ONSET: ICD-10-CM

## 2019-05-06 DIAGNOSIS — F02.80 ALZHEIMER'S DEMENTIA WITHOUT BEHAVIORAL DISTURBANCE, UNSPECIFIED TIMING OF DEMENTIA ONSET: ICD-10-CM

## 2019-05-06 PROCEDURE — 99214 OFFICE O/P EST MOD 30 MIN: CPT | Performed by: FAMILY MEDICINE

## 2019-05-13 DIAGNOSIS — G89.4 CHRONIC PAIN SYNDROME: ICD-10-CM

## 2019-05-14 RX ORDER — FENTANYL 25 UG/H
PATCH TRANSDERMAL
Qty: 10 PATCH | Refills: 0 | Status: SHIPPED | OUTPATIENT
Start: 2019-05-14 | End: 2019-06-10 | Stop reason: SDUPTHER

## 2019-05-14 NOTE — TELEPHONE ENCOUNTER
Last visit: 5/6/19  Last Med refill: 4-11-19 OARRS 5-6-19  Does patient have enough medication for 72 hours: Yes    Next Visit Date:  Future Appointments   Date Time Provider Luis East   8/8/2019 10:45 AM Tyrell aTpia  [address] [facility] Maintenance   Topic Date Due    DTaP/Tdap/Td vaccine (1 - Tdap) 08/17/1952    Shingles Vaccine (1 of 2) 08/17/1983    Potassium monitoring  10/02/2019    Creatinine monitoring  10/02/2019    Flu vaccine  Completed    Pneumococcal 65+ years Vaccine  Completed    DEXA (modify frequency per FRAX score)  Addressed       No results found for: LABA1C          ( goal A1C is < 7)   No results found for: LABMICR  LDL Calculated (mg/dL)   Date Value   04/24/2018 86   10/25/2017 88       (goal LDL is <100)   AST (U/L)   Date Value   07/16/2018 33     ALT (U/L)   Date Value   07/16/2018 25     BUN (mg/dL)   Date Value   10/02/2018 34     BP Readings from Last 3 Encounters:   05/06/19 102/72   02/07/19 112/72   10/16/18 122/80          (goal 120/80)    All Future Testing planned in [facility]  Lab Frequency Next Occurrence   XR SACROILIAC JOINTS (MIN 3 VIEWS) Once 05/25/2018               Patient Active Problem List:     Atrial fibrillation ([address]     Dementia     Hypertension

## 2019-06-10 DIAGNOSIS — G89.4 CHRONIC PAIN SYNDROME: ICD-10-CM

## 2019-06-10 RX ORDER — FENTANYL 25 UG/H
PATCH TRANSDERMAL
Qty: 10 PATCH | Refills: 0 | Status: SHIPPED | OUTPATIENT
Start: 2019-06-10 | End: 2019-07-08 | Stop reason: SDUPTHER

## 2019-06-10 NOTE — TELEPHONE ENCOUNTER
Last visit: 5-6-19  Last Med refill: 5-14-19 OARRS 5-6-19  Does patient have enough medication for 72 hours: Yes    Next Visit Date:  Future Appointments   Date Time Provider Luis East   8/8/2019 10:45 AM Jude Andrade,  [address] Maintenance   Topic Date Due    DTaP/Tdap/Td vaccine (1 - Tdap) 08/17/1952    Shingles Vaccine (1 of 2) 08/17/1983    Potassium monitoring  10/02/2019    Creatinine monitoring  10/02/2019    Flu vaccine  Completed    Pneumococcal 65+ years Vaccine  Completed    DEXA (modify frequency per FRAX score)  Addressed       No results found for: LABA1C          ( goal A1C is < 7)   No results found for: LABMICR  LDL Calculated (mg/dL)   Date Value   04/24/2018 86   10/25/2017 88       (goal LDL is <100)   AST (U/L)   Date Value   07/16/2018 33     ALT (U/L)   Date Value   07/16/2018 25     BUN (mg/dL)   Date Value   10/02/2018 34     BP Readings from Last 3 Encounters:   05/06/19 102/72   02/07/19 112/72   10/16/18 122/80          (goal 120/80)    All Future Testing planned in [facility]  Lab Frequency Next Occurrence               Patient Active Problem List:     Atrial fibrillation ([address]     Dementia     Hypertension

## 2019-07-08 DIAGNOSIS — G89.4 CHRONIC PAIN SYNDROME: ICD-10-CM

## 2019-07-08 RX ORDER — FENTANYL 25 UG/H
PATCH TRANSDERMAL
Qty: 10 PATCH | Refills: 0 | Status: SHIPPED | OUTPATIENT
Start: 2019-07-08 | End: 2019-08-12 | Stop reason: SDUPTHER

## 2019-07-08 NOTE — TELEPHONE ENCOUNTER
Last visit: 5/6/19  Last Med refill: 6-10-19  Does patient have enough medication for 72 hours: Yes    Next Visit Date:  Future Appointments   Date Time Provider Luis East   8/8/2019 10:45 AM Sourav Calderon, [address] [facility] Maintenance   Topic Date Due    DTaP/Tdap/Td vaccine (1 - Tdap) 08/17/1952    Shingles Vaccine (1 of 2) 08/17/1983    Annual Wellness Visit (AWV)  04/15/2017    Flu vaccine (1) 09/01/2019    Potassium monitoring  10/02/2019    Creatinine monitoring  10/02/2019    Pneumococcal 65+ years Vaccine  Completed    DEXA (modify frequency per FRAX score)  Addressed       No results found for: LABA1C          ( goal A1C is < 7)   No results found for: LABMICR  LDL Calculated (mg/dL)   Date Value   04/24/2018 86   10/25/2017 88       (goal LDL is <100)   AST (U/L)   Date Value   07/16/2018 33     ALT (U/L)   Date Value   07/16/2018 25     BUN (mg/dL)   Date Value   10/02/2018 34     BP Readings from Last 3 Encounters:   05/06/19 102/72   02/07/19 112/72   10/16/18 122/80          (goal 120/80)    All Future Testing planned in [facility]  Lab Frequency Next Occurrence               Patient Active Problem List:     Atrial fibrillation ([facility] [address]     Dementia     Hypertension

## 2019-08-08 ENCOUNTER — OFFICE VISIT (OUTPATIENT)
Dept: FAMILY MEDICINE CLINIC | Age: 84
End: 2019-08-08
Payer: MEDICARE

## 2019-08-08 VITALS
WEIGHT: 125.2 LBS | SYSTOLIC BLOOD PRESSURE: 114 MMHG | OXYGEN SATURATION: 94 % | BODY MASS INDEX: 22.53 KG/M2 | DIASTOLIC BLOOD PRESSURE: 74 MMHG | HEART RATE: 64 BPM

## 2019-08-08 DIAGNOSIS — G30.9 ALZHEIMER'S DEMENTIA WITHOUT BEHAVIORAL DISTURBANCE, UNSPECIFIED TIMING OF DEMENTIA ONSET: ICD-10-CM

## 2019-08-08 DIAGNOSIS — F02.80 ALZHEIMER'S DEMENTIA WITHOUT BEHAVIORAL DISTURBANCE, UNSPECIFIED TIMING OF DEMENTIA ONSET: ICD-10-CM

## 2019-08-08 DIAGNOSIS — E55.9 VITAMIN D DEFICIENCY: Primary | ICD-10-CM

## 2019-08-08 DIAGNOSIS — I48.91 ATRIAL FIBRILLATION, UNSPECIFIED TYPE (HCC): ICD-10-CM

## 2019-08-08 DIAGNOSIS — Z00.00 ROUTINE ADULT HEALTH MAINTENANCE: ICD-10-CM

## 2019-08-08 DIAGNOSIS — I10 ESSENTIAL HYPERTENSION: ICD-10-CM

## 2019-08-08 PROCEDURE — 99214 OFFICE O/P EST MOD 30 MIN: CPT | Performed by: FAMILY MEDICINE

## 2019-08-08 RX ORDER — ALENDRONATE SODIUM 70 MG/1
70 TABLET ORAL
COMMUNITY

## 2019-08-08 NOTE — PROGRESS NOTES
Visit Information    Have you changed or started any medications since your last visit including any over-the-counter medicines, vitamins, or herbal medicines? no   Are you having any side effects from any of your medications? -  no  Have you stopped taking any of your medications? Is so, why? -  no    Have you seen any other physician or provider since your last visit? No  Have you had any other diagnostic tests since your last visit? No  Have you been seen in the emergency room and/or had an admission to a hospital since we last saw you? No  Have you had your routine dental cleaning in the past 6 months? no    Have you activated your [employer] account? If not, what are your barriers?  Yes     Patient Care Team:  Gilbert Barney [facility] PCP [facility] Provider    Medical History Review  Past Medical, Family, and Social History reviewed and  contribute to the patient presenting condition    Health Maintenance   Topic Date Due    DTaP/Tdap/Td vaccine (1 - Tdap) 08/17/1952    Shingles Vaccine (1 of 2) 08/17/1983    Annual Wellness Visit (AWV)  04/15/2017    Flu vaccine (1) 09/01/2019    Potassium monitoring  10/02/2019    Creatinine monitoring  10/02/2019    Pneumococcal 65+ years Vaccine  Completed    DEXA (modify frequency per FRAX score)  Addressed

## 2019-08-11 DIAGNOSIS — G89.4 CHRONIC PAIN SYNDROME: ICD-10-CM

## 2019-08-12 DIAGNOSIS — G89.4 CHRONIC PAIN SYNDROME: ICD-10-CM

## 2019-08-12 RX ORDER — FENTANYL 25 UG/H
PATCH TRANSDERMAL
Qty: 10 PATCH | Refills: 0 | Status: CANCELLED | OUTPATIENT
Start: 2019-08-12 | End: 2019-09-12

## 2019-08-12 RX ORDER — FENTANYL 25 UG/H
1 PATCH TRANSDERMAL
Qty: 10 PATCH | Refills: 0 | Status: SHIPPED | OUTPATIENT
Start: 2019-08-12 | End: 2019-09-10 | Stop reason: SDUPTHER

## 2019-08-12 RX ORDER — FENTANYL 25 UG/H
PATCH TRANSDERMAL
Qty: 10 PATCH | Refills: 0 | Status: SHIPPED | OUTPATIENT
Start: 2019-08-12 | End: 2019-09-11

## 2019-08-27 ENCOUNTER — TELEPHONE (OUTPATIENT)
Dept: FAMILY MEDICINE CLINIC | Age: 84
End: 2019-08-27

## 2019-08-27 DIAGNOSIS — I48.91 ATRIAL FIBRILLATION, UNSPECIFIED TYPE (HCC): Primary | ICD-10-CM

## 2019-08-27 DIAGNOSIS — F03.91 DEMENTIA WITH BEHAVIORAL DISTURBANCE, UNSPECIFIED DEMENTIA TYPE: ICD-10-CM

## 2019-08-27 NOTE — TELEPHONE ENCOUNTER
Patient's  Omar Gamboa left a voice mail on my phone asking if you will ok a referral for Tre Willett to have evaluation by Palliative care. If ok he is asking for the referral to be faxed to [id number] [address] [facility] at [phone].  Please advise

## 2019-09-09 DIAGNOSIS — G89.4 CHRONIC PAIN SYNDROME: ICD-10-CM

## 2019-09-10 RX ORDER — FENTANYL 25 UG/H
1 PATCH TRANSDERMAL
Qty: 10 PATCH | Refills: 0 | Status: SHIPPED | OUTPATIENT
Start: 2019-09-10 | End: 2019-10-08 | Stop reason: SDUPTHER

## 2019-09-11 ENCOUNTER — TELEPHONE (OUTPATIENT)
Dept: FAMILY MEDICINE CLINIC | Age: 84
End: 2019-09-11

## 2019-09-11 DIAGNOSIS — I48.91 ATRIAL FIBRILLATION, UNSPECIFIED TYPE (HCC): Primary | ICD-10-CM

## 2019-09-11 DIAGNOSIS — F03.91 DEMENTIA WITH BEHAVIORAL DISTURBANCE, UNSPECIFIED DEMENTIA TYPE: ICD-10-CM

## 2019-09-11 NOTE — TELEPHONE ENCOUNTER
would like patient to be placed under hospice care, we did an order for pallitaive care but she needs more than that.   Please fax order to [phone]

## 2019-09-30 LAB
ALBUMIN SERPL-MCNC: 4 G/DL
ALP BLD-CCNC: 71 U/L
ALT SERPL-CCNC: 28 U/L
ANION GAP SERPL CALCULATED.3IONS-SCNC: NORMAL MMOL/L
AST SERPL-CCNC: 33 U/L
BASOPHILS ABSOLUTE: NORMAL /ΜL
BASOPHILS RELATIVE PERCENT: NORMAL %
BILIRUB SERPL-MCNC: 0.5 MG/DL (ref 0.1–1.4)
BUN BLDV-MCNC: 56 MG/DL
CALCIUM SERPL-MCNC: 10.2 MG/DL
CHLORIDE BLD-SCNC: 106 MMOL/L
CO2: 25 MMOL/L
CREAT SERPL-MCNC: 1.4 MG/DL
EOSINOPHILS ABSOLUTE: NORMAL /ΜL
EOSINOPHILS RELATIVE PERCENT: NORMAL %
GFR CALCULATED: 35.7
GLUCOSE BLD-MCNC: 93 MG/DL
HCT VFR BLD CALC: 38.5 % (ref 36–46)
HEMOGLOBIN: 12.7 G/DL (ref 12–16)
LYMPHOCYTES ABSOLUTE: NORMAL /ΜL
LYMPHOCYTES RELATIVE PERCENT: NORMAL %
MCH RBC QN AUTO: 32.3 PG
MCHC RBC AUTO-ENTMCNC: 33 G/DL
MCV RBC AUTO: 98 FL
MONOCYTES ABSOLUTE: NORMAL /ΜL
MONOCYTES RELATIVE PERCENT: NORMAL %
NEUTROPHILS ABSOLUTE: NORMAL /ΜL
NEUTROPHILS RELATIVE PERCENT: NORMAL %
PLATELET # BLD: 191 K/ΜL
PMV BLD AUTO: NORMAL FL
POTASSIUM SERPL-SCNC: 5.1 MMOL/L
RBC # BLD: 50 10^6/ΜL
SODIUM BLD-SCNC: 137 MMOL/L
TOTAL PROTEIN: 7.1
VITAMIN D 25-HYDROXY: 13
VITAMIN D2, 25 HYDROXY: NORMAL
VITAMIN D3,25 HYDROXY: NORMAL
WBC # BLD: 6.36 10^3/ML

## 2019-10-08 DIAGNOSIS — G89.4 CHRONIC PAIN SYNDROME: ICD-10-CM

## 2019-10-08 RX ORDER — FENTANYL 25 UG/H
1 PATCH TRANSDERMAL
Qty: 10 PATCH | Refills: 0 | Status: SHIPPED | OUTPATIENT
Start: 2019-10-08 | End: 2019-11-07

## 2019-10-15 DIAGNOSIS — I10 ESSENTIAL HYPERTENSION: ICD-10-CM

## 2019-10-15 DIAGNOSIS — I48.91 ATRIAL FIBRILLATION, UNSPECIFIED TYPE (HCC): ICD-10-CM

## 2019-10-15 DIAGNOSIS — E55.9 VITAMIN D DEFICIENCY: ICD-10-CM

## 2019-10-15 DIAGNOSIS — Z00.00 ROUTINE ADULT HEALTH MAINTENANCE: ICD-10-CM
